# Patient Record
Sex: FEMALE | Race: WHITE | URBAN - METROPOLITAN AREA
[De-identification: names, ages, dates, MRNs, and addresses within clinical notes are randomized per-mention and may not be internally consistent; named-entity substitution may affect disease eponyms.]

---

## 2022-09-01 ENCOUNTER — DOCTOR'S OFFICE (OUTPATIENT)
Dept: URBAN - METROPOLITAN AREA CLINIC 125 | Facility: CLINIC | Age: 47
Setting detail: OPHTHALMOLOGY
End: 2022-09-01
Payer: OTHER GOVERNMENT

## 2022-09-01 ENCOUNTER — RX ONLY (RX ONLY)
Age: 47
End: 2022-09-01

## 2022-09-01 DIAGNOSIS — H35.373: ICD-10-CM

## 2022-09-01 DIAGNOSIS — H40.033: ICD-10-CM

## 2022-09-01 PROCEDURE — 92134 CPTRZ OPH DX IMG PST SGM RTA: CPT | Performed by: OPHTHALMOLOGY

## 2022-09-01 PROCEDURE — 92020 GONIOSCOPY: CPT | Performed by: OPHTHALMOLOGY

## 2022-09-01 PROCEDURE — 92004 COMPRE OPH EXAM NEW PT 1/>: CPT | Performed by: OPHTHALMOLOGY

## 2022-09-01 ASSESSMENT — REFRACTION_AUTOREFRACTION
OS_SPHERE: +4.75
OD_AXIS: 003
OS_CYLINDER: -0.75
OD_CYLINDER: -0.25
OD_SPHERE: +2.00
OS_AXIS: 024

## 2022-09-01 ASSESSMENT — VISUAL ACUITY
OS_BCVA: 20/20-2
OD_BCVA: 20/80

## 2022-09-01 ASSESSMENT — CONFRONTATIONAL VISUAL FIELD TEST (CVF)
OS_FINDINGS: FULL
OD_FINDINGS: FULL

## 2022-09-01 ASSESSMENT — AXIALLENGTH_DERIVED
OS_AL: 21.457
OD_AL: 22.0108

## 2022-09-01 ASSESSMENT — KERATOMETRY
OD_AXISANGLE_DEGREES: 090
OS_AXISANGLE_DEGREES: 099
OS_K2POWER_DIOPTERS: 46.25
OD_K2POWER_DIOPTERS: 47.00
OD_K1POWER_DIOPTERS: 45.25
OS_K1POWER_DIOPTERS: 44.25

## 2022-09-01 ASSESSMENT — SPHEQUIV_DERIVED
OS_SPHEQUIV: 4.375
OD_SPHEQUIV: 1.875

## 2022-09-21 ENCOUNTER — AMBUL SURGICAL CARE (OUTPATIENT)
Dept: URBAN - METROPOLITAN AREA SURGERY 29 | Facility: SURGERY | Age: 47
Setting detail: OPHTHALMOLOGY
End: 2022-09-21
Payer: OTHER GOVERNMENT

## 2022-09-21 DIAGNOSIS — H40.032: ICD-10-CM

## 2022-09-21 PROCEDURE — 66761 REVISION OF IRIS: CPT | Performed by: OPHTHALMOLOGY

## 2022-10-05 ENCOUNTER — AMBUL SURGICAL CARE (OUTPATIENT)
Dept: URBAN - METROPOLITAN AREA SURGERY 29 | Facility: SURGERY | Age: 47
Setting detail: OPHTHALMOLOGY
End: 2022-10-05
Payer: OTHER GOVERNMENT

## 2022-10-05 DIAGNOSIS — H40.031: ICD-10-CM

## 2022-10-05 PROCEDURE — 66761 REVISION OF IRIS: CPT | Performed by: OPHTHALMOLOGY

## 2022-11-03 ENCOUNTER — DOCTOR'S OFFICE (OUTPATIENT)
Dept: URBAN - METROPOLITAN AREA CLINIC 125 | Facility: CLINIC | Age: 47
Setting detail: OPHTHALMOLOGY
End: 2022-11-03
Payer: OTHER GOVERNMENT

## 2022-11-03 DIAGNOSIS — H35.373: ICD-10-CM

## 2022-11-03 DIAGNOSIS — H04.123: ICD-10-CM

## 2022-11-03 DIAGNOSIS — H40.033: ICD-10-CM

## 2022-11-03 PROCEDURE — 99213 OFFICE O/P EST LOW 20 MIN: CPT | Performed by: OPHTHALMOLOGY

## 2022-11-03 ASSESSMENT — AXIALLENGTH_DERIVED
OS_AL: 21.4997
OD_AL: 22.3427

## 2022-11-03 ASSESSMENT — REFRACTION_AUTOREFRACTION
OD_SPHERE: +1.75
OS_SPHERE: +4.75
OS_CYLINDER: -1.25
OD_CYLINDER: -0.50
OS_AXIS: 021
OD_AXIS: 006

## 2022-11-03 ASSESSMENT — SPHEQUIV_DERIVED
OS_SPHEQUIV: 4.125
OD_SPHEQUIV: 1.5

## 2022-11-03 ASSESSMENT — CONFRONTATIONAL VISUAL FIELD TEST (CVF)
OS_FINDINGS: FULL
OD_FINDINGS: FULL

## 2022-11-03 ASSESSMENT — KERATOMETRY
OD_K2POWER_DIOPTERS: 46.25
OD_K1POWER_DIOPTERS: 44.75
OS_AXISANGLE_DEGREES: 104
OS_K1POWER_DIOPTERS: 44.00
OD_AXISANGLE_DEGREES: 093
OS_K2POWER_DIOPTERS: 46.75

## 2022-11-03 ASSESSMENT — VISUAL ACUITY
OD_BCVA: 20/30-2
OS_BCVA: 20/20-1

## 2024-02-12 ENCOUNTER — OFFICE VISIT (OUTPATIENT)
Age: 49
End: 2024-02-12
Payer: OTHER GOVERNMENT

## 2024-02-12 VITALS
SYSTOLIC BLOOD PRESSURE: 126 MMHG | DIASTOLIC BLOOD PRESSURE: 68 MMHG | OXYGEN SATURATION: 100 % | WEIGHT: 159 LBS | HEART RATE: 61 BPM | BODY MASS INDEX: 26.49 KG/M2 | RESPIRATION RATE: 16 BRPM | TEMPERATURE: 97.1 F | HEIGHT: 65 IN

## 2024-02-12 DIAGNOSIS — R10.2 PELVIC PAIN IN FEMALE: Primary | ICD-10-CM

## 2024-02-12 DIAGNOSIS — M54.9 MID-BACK PAIN, ACUTE: ICD-10-CM

## 2024-02-12 LAB
SL AMB  POCT GLUCOSE, UA: NEGATIVE
SL AMB LEUKOCYTE ESTERASE,UA: NEGATIVE
SL AMB POCT BILIRUBIN,UA: NEGATIVE
SL AMB POCT BLOOD,UA: ABNORMAL
SL AMB POCT CLARITY,UA: CLEAR
SL AMB POCT COLOR,UA: YELLOW
SL AMB POCT KETONES,UA: NEGATIVE
SL AMB POCT NITRITE,UA: NEGATIVE
SL AMB POCT PH,UA: 6
SL AMB POCT SPECIFIC GRAVITY,UA: 1.02
SL AMB POCT URINE PROTEIN: NEGATIVE
SL AMB POCT UROBILINOGEN: 0.2

## 2024-02-12 PROCEDURE — 99213 OFFICE O/P EST LOW 20 MIN: CPT | Performed by: EMERGENCY MEDICINE

## 2024-02-12 PROCEDURE — 87086 URINE CULTURE/COLONY COUNT: CPT | Performed by: EMERGENCY MEDICINE

## 2024-02-12 PROCEDURE — G0463 HOSPITAL OUTPT CLINIC VISIT: HCPCS | Performed by: EMERGENCY MEDICINE

## 2024-02-12 PROCEDURE — 81002 URINALYSIS NONAUTO W/O SCOPE: CPT | Performed by: EMERGENCY MEDICINE

## 2024-02-12 RX ORDER — ALBUTEROL SULFATE 90 UG/1
AEROSOL, METERED RESPIRATORY (INHALATION)
COMMUNITY
Start: 2023-08-23

## 2024-02-12 RX ORDER — ALPRAZOLAM 0.5 MG/1
0.5 TABLET ORAL
COMMUNITY

## 2024-02-12 NOTE — PROGRESS NOTES
[facility]        NAME: Matilda Parker is a 48 y.o. female  : 1975    MRN: [id number]  DATE: 2024  TIME: 5:36 PM    Assessment and Plan   Pelvic pain in female [R10.2]  1. Pelvic pain in female  POCT urine dip    Ambulatory Referral to Physical Therapy    Ambulatory Referral to Obstetrics / Gynecology      2. Mid-back pain, acute  Ambulatory Referral to Physical Therapy            Patient Instructions     Patient Instructions   Pelvic Pain   WHAT YOU NEED TO KNOW:   Pelvic pain may be caused by a number of conditions, such as irritable bowel syndrome, appendicitis, or constipation. A urinary tract infection, prostate inflammation, menstrual cramps, or kidney stones can also cause pelvic pain. You may have pain on one or both sides of your pelvis. Pelvic pain can develop if you have trauma to your pelvis or if you sit or stand for a long time.   DISCHARGE INSTRUCTIONS:   Medicines:  You may need any of the following:  NSAIDs , such as ibuprofen, help decrease swelling, pain, and fever. This medicine is available with or without a doctor's order. NSAIDs can cause stomach bleeding or kidney problems in certain people. If you take blood thinner medicine, always ask your healthcare provider if NSAIDs are safe for you. Always read the medicine label and follow directions.    Prescription pain medicine  may be given. Ask how to take this medicine safely.    Birth control medicines  may help decrease pain in women.    Take your medicine as directed.  Contact your healthcare provider if you think your medicine is not helping or if you have side effects. Tell your provider if you are allergic to any medicine. Keep a list of the medicines, vitamins, and herbs you take. Include the amounts, and when and why you take them. Bring the list or the pill bottles to follow-up visits. Carry your medicine list with you in case of an emergency.    Call 911 for any of the following:   You have severe chest pain  and sudden trouble breathing.      Contact your healthcare provider if:   You have a fever.    You have nausea, vomiting, or diarrhea.    Your pain does not go away, or it gets worse, even after treatment.    You have numbness in your legs or toes.    You have heavy or unusual vaginal bleeding.    You have questions or concerns about your condition or care.    Manage your pelvic pain:   Keep a pain record.  Write down when your pain happens and how severe it is. Include any other symptoms you have with your pain. A record will help you keep track of pain cycles. Bring the record with you to your follow-up visits. It may also help your healthcare provider find out what is causing your pain.    Learn ways to relax.  Deep breathing, meditation, and relaxation techniques can help decrease your pain. When you are tense, your pain may increase.    Treat or prevent constipation.  Drink liquids as directed. You may need to drink more liquid than usual. Ask your healthcare provider how much liquid to drink each day. Eat high-fiber foods. High-fiber foods include fruit, vegetables, whole-grain breads and cereals, and beans. You may need to change the foods you eat if you have irritable bowel syndrome.    Follow up with your healthcare provider as directed:  You may need physical therapy. You may need to see an orthopedic specialist. Write down your questions so you remember to ask them during your visits.  © Copyright [employer] 2023 Information is for End User's use only and may not be sold, redistributed or otherwise used for commercial purposes.  The above information is an  only. It is not intended as medical advice for individual conditions or treatments. Talk to your doctor, nurse or pharmacist before following any medical regimen to see if it is safe and effective for you.    Abdominal Pain   WHAT YOU NEED TO KNOW:   Abdominal pain can be dull, achy, or sharp. You may have pain in one area of your abdomen,  or in your entire abdomen. Your pain may be caused by a condition such as constipation, food sensitivity or poisoning, infection, or a blockage. Abdominal pain can also be from a hernia, appendicitis, or an ulcer. Liver, gallbladder, or kidney conditions can also cause abdominal pain. The cause of your abdominal pain may not be known.       DISCHARGE INSTRUCTIONS:   Call your local emergency number (911 in the [place]) if:   You have chest pain or shortness of breath.      Return to the emergency department if:   You have pulsing pain in your upper abdomen or lower back that suddenly becomes constant.    Your pain is in the right lower abdominal area and worsens with movement.    You have a fever over 100.4°F (38°C) or shaking chills.    You are vomiting and cannot keep food or liquids down.    Your pain does not improve or gets worse over the next 8 to 12 hours.    You see blood in your vomit or bowel movements, or they look black and tarry.    Your skin or the whites of your eyes turn yellow.    You are a woman and have a large amount of vaginal bleeding that is not your monthly period.    Call your doctor if:   You have pain in your lower back.    You are a man and have pain in your testicles.    You have pain when you urinate.    You have questions or concerns about your condition or care.    Medicines:  You may need any of the following:  Medicines  may be given to calm your stomach or prevent vomiting.    Prescription pain medicine  may be given. Ask your healthcare provider how to take this medicine safely. Some prescription pain medicines contain acetaminophen. Do not take other medicines that contain acetaminophen without talking to your healthcare provider. Too much acetaminophen may cause liver damage. Prescription pain medicine may cause constipation. Ask your healthcare provider how to prevent or treat constipation.     Take your medicine as directed.  Contact your healthcare provider if you think your  medicine is not helping or if you have side effects. Tell your provider if you are allergic to any medicine. Keep a list of the medicines, vitamins, and herbs you take. Include the amounts, and when and why you take them. Bring the list or the pill bottles to follow-up visits. Carry your medicine list with you in case of an emergency.    Manage or prevent abdominal pain:   Apply heat  on your abdomen for 20 to 30 minutes every 2 hours for as many days as directed. Heat helps decrease pain and muscle spasms.    Make changes to the foods you eat, if needed.  Do not eat foods that cause abdominal pain or other symptoms. Eat small meals more often. The following changes may also help:    Eat more high-fiber foods if you are constipated.  High-fiber foods include fruits, vegetables, whole-grain foods, and legumes such as ortiz beans.         Do not eat foods that cause gas if you have bloating.  Examples include broccoli, cabbage, beans, and carbonated drinks.    Do not eat foods or drinks that contain sorbitol or fructose if you have diarrhea and bloating.  Some examples are fruit juices, candy, jelly, and sugar-free gum.    Do not eat high-fat foods.  Examples include fried foods, cheeseburgers, hot dogs, and desserts.    Make changes to the liquids you drink, if needed.  Do not drink liquids that cause pain or make it worse, such as orange juice. Drink liquids throughout the day to stay hydrated. The following changes may also help:    Drink more liquids to prevent dehydration from diarrhea or vomiting.  Ask your healthcare provider how much liquid to drink each day and which liquids are best for you.    Limit or do not have caffeine.  Caffeine may make symptoms such as heartburn or nausea worse.    Limit or do not drink alcohol.  Alcohol can make your abdominal pain worse. Ask your healthcare provider if it is okay for you to drink alcohol. Also ask how much is okay for you to drink. A drink of alcohol is 12 ounces  of beer, ½ ounce of liquor, or 5 ounces of wine.    Keep a diary of your abdominal pain.  A diary may help your healthcare provider learn what is causing your pain. Include when the pain happens, how long it lasts, and what the pain feels like. Write down any other symptoms you have with abdominal pain. Also write down what you eat, and any symptoms you have after you eat.    Manage stress.  Stress may cause abdominal pain. Your healthcare provider may recommend relaxation techniques and deep breathing exercises to help decrease your stress. Your healthcare provider may recommend you talk to someone about your stress or anxiety, such as a counselor or a friend. Get plenty of sleep. Exercise regularly.         Do not smoke.  Nicotine and other chemicals in cigarettes can damage your esophagus and stomach. Ask your healthcare provider for information if you currently smoke and need help to quit. E-cigarettes or smokeless tobacco still contain nicotine. Talk to your healthcare provider before you use these products.    Follow up with your doctor as directed:  Write down your questions so you remember to ask them during your visits.  © Copyright [employer] 2023 Information is for End User's use only and may not be sold, redistributed or otherwise used for commercial purposes.  The above information is an  only. It is not intended as medical advice for individual conditions or treatments. Talk to your doctor, nurse or pharmacist before following any medical regimen to see if it is safe and effective for you.  Back Pain   WHAT YOU NEED TO KNOW:   Back pain is common. You may have back pain and muscle spasms. You may feel sore or stiff on one or both sides of your back. The pain may spread to your lower body. Conditions that affect the spine, joints, or muscles can cause back pain. These may include arthritis, spinal stenosis (narrowing of the spinal column), muscle tension, or breakdown of the spinal  discs.  DISCHARGE INSTRUCTIONS:   Call your local emergency number (911 in the [place]) if:   You have severe back pain with chest pain.    You cannot control your urine or bowel movements.    Your pain becomes so severe that you cannot walk.    Return to the emergency department if:   You have pain, numbness, or weakness in one or both legs.    You have severe back pain, nausea, and vomiting.    You have severe back pain that spreads to your side or genital area.    Call your doctor if:   You have back pain that does not get better with rest and pain medicine.    You have a fever.    You have pain that worsens when you are on your back or when you rest.    You have pain that worsens when you cough or sneeze.    You lose weight without trying.    You have questions or concerns about your condition or care.    Medicines:  You may need any of the following:  NSAIDs  help decrease swelling and pain or fever. This medicine is available with or without a doctor's order. NSAIDs can cause stomach bleeding or kidney problems in certain people. If you take blood thinner medicine, always ask your healthcare provider if NSAIDs are safe for you. Always read the medicine label and follow directions.    Acetaminophen  decreases pain and fever. It is available without a doctor's order. Ask how much to take and how often to take it. Follow directions. Read the labels of all other medicines you are using to see if they also contain acetaminophen, or ask your doctor or pharmacist. Acetaminophen can cause liver damage if not taken correctly.    Muscle relaxers  help decrease muscle spasms and back pain.    Prescription pain medicine  may be given. Ask your healthcare provider how to take this medicine safely. Some prescription pain medicines contain acetaminophen. Do not take other medicines that contain acetaminophen without talking to your healthcare provider. Too much acetaminophen may cause liver damage. Prescription pain medicine may  cause constipation. Ask your healthcare provider how to prevent or treat constipation.     Take your medicine as directed.  Contact your healthcare provider if you think your medicine is not helping or if you have side effects. Tell your provider if you are allergic to any medicine. Keep a list of the medicines, vitamins, and herbs you take. Include the amounts, and when and why you take them. Bring the list or the pill bottles to follow-up visits. Carry your medicine list with you in case of an emergency.    How to manage your back pain:   Apply ice  on your back for 15 to 20 minutes every hour or as directed. Use an ice pack, or put crushed ice in a plastic bag. Cover it with a towel before you apply it to your skin. Ice helps prevent tissue damage and decreases pain.    Apply heat  on your back for 20 to 30 minutes every 2 hours for as many days as directed. Heat helps decrease pain and muscle spasms.    Stay active  as much as you can without causing more pain. Bed rest could make your back pain worse. Avoid heavy lifting until your pain is gone.         Go to physical therapy  as directed. A physical therapist can teach you exercises to help improve movement and strength, and to decrease pain.    Follow up with your doctor in 2 weeks, or as directed:  You might need to see a specialist. Write down your questions so you remember to ask them during your visits.  © Copyright [employer] 2023 Information is for End User's use only and may not be sold, redistributed or otherwise used for commercial purposes.  The above information is an  only. It is not intended as medical advice for individual conditions or treatments. Talk to your doctor, nurse or pharmacist before following any medical regimen to see if it is safe and effective for you.      Follow up with PCP in 3-5 days.  Proceed to  ER if symptoms worsen.    Chief Complaint     Chief Complaint   Patient presents with    Pelvic Pain     Two days.  "Low pelvic pain and mid back pain. Going through menopause but menses started to become regular again. Menses ended about four days ago. Denies urinary frequency, no burning with urination. No concern  for STD. No irregular vaginal discharge.         History of Present Illness       Patient complains of pelvic \"pressure\" for the past few days.  Patient denies any urinary symptoms.  Patient denies similar symptoms in the past.  Patient had her routine GYN examination a few months ago with no abnormalities detected.  She also notes some mid back pain recently but denies any precipitating trauma.        Review of Systems   Review of Systems   Constitutional:  Negative for chills, fatigue and fever.   HENT:  Negative for congestion, trouble swallowing and voice change.    Respiratory:  Negative for cough, chest tightness, shortness of breath and wheezing.    Cardiovascular:  Negative for chest pain.   Gastrointestinal:  Negative for abdominal distention, abdominal pain, anal bleeding, blood in stool, constipation, diarrhea, nausea, rectal pain and vomiting.   Genitourinary:  Positive for pelvic pain. Negative for dysuria.   Musculoskeletal:  Positive for back pain. Negative for neck stiffness.   Skin:  Negative for rash.   Hematological:  Negative for adenopathy.         Current Medications       Current Outpatient Medications:     albuterol (PROVENTIL HFA,VENTOLIN HFA) 90 mcg/act inhaler, inhale 1 to 2 puffs INTO THE LUNGS every 6 hours if needed, Disp: , Rfl:     ALPRAZolam (XANAX) 0.5 mg tablet, Take 0.5 mg by mouth, Disp: , Rfl:     Current Allergies     Allergies as of 02/12/2024    (No Known Allergies)            The following portions of the patient's history were reviewed and updated as appropriate: allergies, current medications, past family history, past medical history, past social history, past surgical history and problem list.     History reviewed. No pertinent past medical history.    Past Surgical " "History:   Procedure Laterality Date    SKIN CANCER EXCISION      TONSILLECTOMY         Family History   Problem Relation Age of Onset    No Known Problems Mother     No Known Problems Father          Medications have been verified.        Objective   /68   Pulse 61   Temp (!) 97.1 °F (36.2 °C)   Resp 16   Ht 5' 5\" (1.651 m)   Wt 72.1 kg (159 lb)   SpO2 100%   BMI 26.46 kg/m²        Physical Exam     Physical Exam  Vitals and nursing note reviewed.   Constitutional:       General: She is not in acute distress.     Appearance: She is well-developed. She is not ill-appearing or toxic-appearing.   HENT:      Head: Normocephalic and atraumatic.      Nose: Mucosal edema present.      Mouth/Throat:      Pharynx: No oropharyngeal exudate or posterior oropharyngeal erythema.      Tonsils: No tonsillar abscesses.   Cardiovascular:      Rate and Rhythm: Normal rate and regular rhythm.   Abdominal:      General: Abdomen is flat. Bowel sounds are normal. There is no distension.      Palpations: Abdomen is soft. There is no mass.      Tenderness: There is no abdominal tenderness. There is no guarding or rebound.   Musculoskeletal:      Cervical back: Neck supple.   Skin:     General: Skin is warm and dry.      Coloration: Skin is not pale.      Findings: No rash.   Neurological:      Mental Status: She is alert and oriented to person, place, and time.   Psychiatric:         Mood and Affect: Mood normal.         Behavior: Behavior normal.         Thought Content: Thought content normal.         Judgment: Judgment normal.                   "

## 2024-02-12 NOTE — PATIENT INSTRUCTIONS
Pelvic Pain   WHAT YOU NEED TO KNOW:   Pelvic pain may be caused by a number of conditions, such as irritable bowel syndrome, appendicitis, or constipation. A urinary tract infection, prostate inflammation, menstrual cramps, or kidney stones can also cause pelvic pain. You may have pain on one or both sides of your pelvis. Pelvic pain can develop if you have trauma to your pelvis or if you sit or stand for a long time.   DISCHARGE INSTRUCTIONS:   Medicines:  You may need any of the following:  NSAIDs , such as ibuprofen, help decrease swelling, pain, and fever. This medicine is available with or without a doctor's order. NSAIDs can cause stomach bleeding or kidney problems in certain people. If you take blood thinner medicine, always ask your healthcare provider if NSAIDs are safe for you. Always read the medicine label and follow directions.    Prescription pain medicine  may be given. Ask how to take this medicine safely.    Birth control medicines  may help decrease pain in women.    Take your medicine as directed.  Contact your healthcare provider if you think your medicine is not helping or if you have side effects. Tell your provider if you are allergic to any medicine. Keep a list of the medicines, vitamins, and herbs you take. Include the amounts, and when and why you take them. Bring the list or the pill bottles to follow-up visits. Carry your medicine list with you in case of an emergency.    Call 911 for any of the following:   You have severe chest pain and sudden trouble breathing.      Contact your healthcare provider if:   You have a fever.    You have nausea, vomiting, or diarrhea.    Your pain does not go away, or it gets worse, even after treatment.    You have numbness in your legs or toes.    You have heavy or unusual vaginal bleeding.    You have questions or concerns about your condition or care.    Manage your pelvic pain:   Keep a pain record.  Write down when your pain happens and how severe  it is. Include any other symptoms you have with your pain. A record will help you keep track of pain cycles. Bring the record with you to your follow-up visits. It may also help your healthcare provider find out what is causing your pain.    Learn ways to relax.  Deep breathing, meditation, and relaxation techniques can help decrease your pain. When you are tense, your pain may increase.    Treat or prevent constipation.  Drink liquids as directed. You may need to drink more liquid than usual. Ask your healthcare provider how much liquid to drink each day. Eat high-fiber foods. High-fiber foods include fruit, vegetables, whole-grain breads and cereals, and beans. You may need to change the foods you eat if you have irritable bowel syndrome.    Follow up with your healthcare provider as directed:  You may need physical therapy. You may need to see an orthopedic specialist. Write down your questions so you remember to ask them during your visits.  © Copyright [employer] 2023 Information is for End User's use only and may not be sold, redistributed or otherwise used for commercial purposes.  The above information is an  only. It is not intended as medical advice for individual conditions or treatments. Talk to your doctor, nurse or pharmacist before following any medical regimen to see if it is safe and effective for you.    Abdominal Pain   WHAT YOU NEED TO KNOW:   Abdominal pain can be dull, achy, or sharp. You may have pain in one area of your abdomen, or in your entire abdomen. Your pain may be caused by a condition such as constipation, food sensitivity or poisoning, infection, or a blockage. Abdominal pain can also be from a hernia, appendicitis, or an ulcer. Liver, gallbladder, or kidney conditions can also cause abdominal pain. The cause of your abdominal pain may not be known.       DISCHARGE INSTRUCTIONS:   Call your local emergency number (911 in the [place]) if:   You have chest pain or shortness of  breath.      Return to the emergency department if:   You have pulsing pain in your upper abdomen or lower back that suddenly becomes constant.    Your pain is in the right lower abdominal area and worsens with movement.    You have a fever over 100.4°F (38°C) or shaking chills.    You are vomiting and cannot keep food or liquids down.    Your pain does not improve or gets worse over the next 8 to 12 hours.    You see blood in your vomit or bowel movements, or they look black and tarry.    Your skin or the whites of your eyes turn yellow.    You are a woman and have a large amount of vaginal bleeding that is not your monthly period.    Call your doctor if:   You have pain in your lower back.    You are a man and have pain in your testicles.    You have pain when you urinate.    You have questions or concerns about your condition or care.    Medicines:  You may need any of the following:  Medicines  may be given to calm your stomach or prevent vomiting.    Prescription pain medicine  may be given. Ask your healthcare provider how to take this medicine safely. Some prescription pain medicines contain acetaminophen. Do not take other medicines that contain acetaminophen without talking to your healthcare provider. Too much acetaminophen may cause liver damage. Prescription pain medicine may cause constipation. Ask your healthcare provider how to prevent or treat constipation.     Take your medicine as directed.  Contact your healthcare provider if you think your medicine is not helping or if you have side effects. Tell your provider if you are allergic to any medicine. Keep a list of the medicines, vitamins, and herbs you take. Include the amounts, and when and why you take them. Bring the list or the pill bottles to follow-up visits. Carry your medicine list with you in case of an emergency.    Manage or prevent abdominal pain:   Apply heat  on your abdomen for 20 to 30 minutes every 2 hours for as many days as  directed. Heat helps decrease pain and muscle spasms.    Make changes to the foods you eat, if needed.  Do not eat foods that cause abdominal pain or other symptoms. Eat small meals more often. The following changes may also help:    Eat more high-fiber foods if you are constipated.  High-fiber foods include fruits, vegetables, whole-grain foods, and legumes such as ortiz beans.         Do not eat foods that cause gas if you have bloating.  Examples include broccoli, cabbage, beans, and carbonated drinks.    Do not eat foods or drinks that contain sorbitol or fructose if you have diarrhea and bloating.  Some examples are fruit juices, candy, jelly, and sugar-free gum.    Do not eat high-fat foods.  Examples include fried foods, cheeseburgers, hot dogs, and desserts.    Make changes to the liquids you drink, if needed.  Do not drink liquids that cause pain or make it worse, such as orange juice. Drink liquids throughout the day to stay hydrated. The following changes may also help:    Drink more liquids to prevent dehydration from diarrhea or vomiting.  Ask your healthcare provider how much liquid to drink each day and which liquids are best for you.    Limit or do not have caffeine.  Caffeine may make symptoms such as heartburn or nausea worse.    Limit or do not drink alcohol.  Alcohol can make your abdominal pain worse. Ask your healthcare provider if it is okay for you to drink alcohol. Also ask how much is okay for you to drink. A drink of alcohol is 12 ounces of beer, ½ ounce of liquor, or 5 ounces of wine.    Keep a diary of your abdominal pain.  A diary may help your healthcare provider learn what is causing your pain. Include when the pain happens, how long it lasts, and what the pain feels like. Write down any other symptoms you have with abdominal pain. Also write down what you eat, and any symptoms you have after you eat.    Manage stress.  Stress may cause abdominal pain. Your healthcare provider may  recommend relaxation techniques and deep breathing exercises to help decrease your stress. Your healthcare provider may recommend you talk to someone about your stress or anxiety, such as a counselor or a friend. Get plenty of sleep. Exercise regularly.         Do not smoke.  Nicotine and other chemicals in cigarettes can damage your esophagus and stomach. Ask your healthcare provider for information if you currently smoke and need help to quit. E-cigarettes or smokeless tobacco still contain nicotine. Talk to your healthcare provider before you use these products.    Follow up with your doctor as directed:  Write down your questions so you remember to ask them during your visits.  © Copyright [employer] 2023 Information is for End User's use only and may not be sold, redistributed or otherwise used for commercial purposes.  The above information is an  only. It is not intended as medical advice for individual conditions or treatments. Talk to your doctor, nurse or pharmacist before following any medical regimen to see if it is safe and effective for you.  Back Pain   WHAT YOU NEED TO KNOW:   Back pain is common. You may have back pain and muscle spasms. You may feel sore or stiff on one or both sides of your back. The pain may spread to your lower body. Conditions that affect the spine, joints, or muscles can cause back pain. These may include arthritis, spinal stenosis (narrowing of the spinal column), muscle tension, or breakdown of the spinal discs.  DISCHARGE INSTRUCTIONS:   Call your local emergency number (372 in the [place]) if:   You have severe back pain with chest pain.    You cannot control your urine or bowel movements.    Your pain becomes so severe that you cannot walk.    Return to the emergency department if:   You have pain, numbness, or weakness in one or both legs.    You have severe back pain, nausea, and vomiting.    You have severe back pain that spreads to your side or genital  area.    Call your doctor if:   You have back pain that does not get better with rest and pain medicine.    You have a fever.    You have pain that worsens when you are on your back or when you rest.    You have pain that worsens when you cough or sneeze.    You lose weight without trying.    You have questions or concerns about your condition or care.    Medicines:  You may need any of the following:  NSAIDs  help decrease swelling and pain or fever. This medicine is available with or without a doctor's order. NSAIDs can cause stomach bleeding or kidney problems in certain people. If you take blood thinner medicine, always ask your healthcare provider if NSAIDs are safe for you. Always read the medicine label and follow directions.    Acetaminophen  decreases pain and fever. It is available without a doctor's order. Ask how much to take and how often to take it. Follow directions. Read the labels of all other medicines you are using to see if they also contain acetaminophen, or ask your doctor or pharmacist. Acetaminophen can cause liver damage if not taken correctly.    Muscle relaxers  help decrease muscle spasms and back pain.    Prescription pain medicine  may be given. Ask your healthcare provider how to take this medicine safely. Some prescription pain medicines contain acetaminophen. Do not take other medicines that contain acetaminophen without talking to your healthcare provider. Too much acetaminophen may cause liver damage. Prescription pain medicine may cause constipation. Ask your healthcare provider how to prevent or treat constipation.     Take your medicine as directed.  Contact your healthcare provider if you think your medicine is not helping or if you have side effects. Tell your provider if you are allergic to any medicine. Keep a list of the medicines, vitamins, and herbs you take. Include the amounts, and when and why you take them. Bring the list or the pill bottles to follow-up visits. Carry  your medicine list with you in case of an emergency.    How to manage your back pain:   Apply ice  on your back for 15 to 20 minutes every hour or as directed. Use an ice pack, or put crushed ice in a plastic bag. Cover it with a towel before you apply it to your skin. Ice helps prevent tissue damage and decreases pain.    Apply heat  on your back for 20 to 30 minutes every 2 hours for as many days as directed. Heat helps decrease pain and muscle spasms.    Stay active  as much as you can without causing more pain. Bed rest could make your back pain worse. Avoid heavy lifting until your pain is gone.         Go to physical therapy  as directed. A physical therapist can teach you exercises to help improve movement and strength, and to decrease pain.    Follow up with your doctor in 2 weeks, or as directed:  You might need to see a specialist. Write down your questions so you remember to ask them during your visits.  © Copyright [employer] 2023 Information is for End User's use only and may not be sold, redistributed or otherwise used for commercial purposes.  The above information is an  only. It is not intended as medical advice for individual conditions or treatments. Talk to your doctor, nurse or pharmacist before following any medical regimen to see if it is safe and effective for you.

## 2024-02-13 LAB — BACTERIA UR CULT: NORMAL

## 2024-03-18 ENCOUNTER — PATIENT MESSAGE (OUTPATIENT)
Age: 49
End: 2024-03-18

## 2024-04-07 ENCOUNTER — OFFICE VISIT (OUTPATIENT)
Age: 49
End: 2024-04-07
Payer: OTHER GOVERNMENT

## 2024-04-07 VITALS
OXYGEN SATURATION: 100 % | HEIGHT: 65 IN | RESPIRATION RATE: 18 BRPM | SYSTOLIC BLOOD PRESSURE: 107 MMHG | TEMPERATURE: 97.2 F | BODY MASS INDEX: 26.49 KG/M2 | HEART RATE: 67 BPM | WEIGHT: 159 LBS | DIASTOLIC BLOOD PRESSURE: 78 MMHG

## 2024-04-07 DIAGNOSIS — L03.031 PARONYCHIA OF GREAT TOE, RIGHT: Primary | ICD-10-CM

## 2024-04-07 PROCEDURE — 10060 I&D ABSCESS SIMPLE/SINGLE: CPT | Performed by: EMERGENCY MEDICINE

## 2024-04-07 PROCEDURE — 87070 CULTURE OTHR SPECIMN AEROBIC: CPT | Performed by: EMERGENCY MEDICINE

## 2024-04-07 PROCEDURE — 87205 SMEAR GRAM STAIN: CPT | Performed by: EMERGENCY MEDICINE

## 2024-04-07 PROCEDURE — 87147 CULTURE TYPE IMMUNOLOGIC: CPT | Performed by: EMERGENCY MEDICINE

## 2024-04-07 PROCEDURE — 99213 OFFICE O/P EST LOW 20 MIN: CPT | Performed by: EMERGENCY MEDICINE

## 2024-04-07 PROCEDURE — 87186 SC STD MICRODIL/AGAR DIL: CPT | Performed by: EMERGENCY MEDICINE

## 2024-04-07 PROCEDURE — G0463 HOSPITAL OUTPT CLINIC VISIT: HCPCS | Performed by: EMERGENCY MEDICINE

## 2024-04-07 RX ORDER — CEPHALEXIN 500 MG/1
500 CAPSULE ORAL EVERY 8 HOURS SCHEDULED
Qty: 21 CAPSULE | Refills: 0 | Status: SHIPPED | OUTPATIENT
Start: 2024-04-07 | End: 2024-04-14

## 2024-04-07 NOTE — PATIENT INSTRUCTIONS
Paronychia   WHAT YOU NEED TO KNOW:   Paronychia is an infection of your nail fold caused by bacteria or a fungus. The nail fold is the skin around your nail. Paronychia may happen suddenly and last for 6 weeks or longer. You may have paronychia on more than 1 finger or toe.  DISCHARGE INSTRUCTIONS:   Return to the emergency department if:   You have severe nail pain.    The inflammation spreads to your hand or arm.    Call your doctor if:   Your nail becomes loose, deformed, or falls off.    You have a large abscess on your nail.    You have questions or concerns about your condition or care.    Medicines:   Td vaccine  is a booster shot used to help prevent tetanus and diphtheria. The Td booster may be given to adolescents and adults every 10 years or for certain wounds and injuries.    Antibiotics:  This medicine will help fight or prevent an infection. It may be given as a pill, cream, or ointment.    Steroids:  This medicine will help decrease inflammation. It may be given as a pill, cream, or ointment.    Antifungal medicine:  This medicine helps kill fungus that may be causing your infection. It may be given as a cream or ointment.    NSAIDs:  These medicines decrease pain and swelling. NSAIDs are available without a doctor's order. Ask your healthcare provider which medicine is right for you. Ask how much to take and when to take it. Take as directed. NSAIDs can cause stomach bleeding and kidney problems if not taken correctly.    Take your medicine as directed.  Contact your healthcare provider if you think your medicine is not helping or if you have side effects. Tell your provider if you are allergic to any medicine. Keep a list of the medicines, vitamins, and herbs you take. Include the amounts, and when and why you take them. Bring the list or the pill bottles to follow-up visits. Carry your medicine list with you in case of an emergency.    Self-care:   Soak your nail:  Soak your nail in a mixture of  equal parts vinegar and water 3 or 4 times each day. This will help decrease inflammation.    Apply a warm compress:  Soak a washcloth in warm water and place it on your nail. This will help decrease inflammation.     Elevate:  Raise your nail above the level of your heart as often as you can. This will help decrease swelling and pain. Prop your nail on pillows or blankets to keep it elevated comfortably.     Use lotion:  Apply lotion after you wash your hands. This will prevent your skin from becoming too dry.    Prevent paronychia:   Avoid chemicals and allergens that may harm your skin and nails. This includes soaps, laundry detergents, and nail products.    Keep your nails clean and dry. Avoid soaking your nails in water. Use cotton-lined rubber gloves or wear 2 rubber gloves if you work with food or water. The gloves will help protect your nail folds.    Keep your nails short. Do not bite your nails, pick at your hangnails, suck your fingers, or wear fake nails. Bring your own nail tools when you go to the nail salon.    Follow up with your doctor as directed:  Write down your questions so you remember to ask them during your visits.  © Copyright [employer] 2023 Information is for End User's use only and may not be sold, redistributed or otherwise used for commercial purposes.  The above information is an  only. It is not intended as medical advice for individual conditions or treatments. Talk to your doctor, nurse or pharmacist before following any medical regimen to see if it is safe and effective for you.  Abscess Incision and Drainage   AMBULATORY CARE:   What you need to know about an abscess incision and drainage:  An abscess incision and drainage (I and D) is a procedure to drain pus from an abscess and clean it out so it can heal.  How to prepare for an I and D:  Your healthcare provider will talk to you about how to prepare for an I and D. He or she will tell you what medicines to take or  not take on the day of your I and D.  What will happen during an I and D:  You will be given local or regional anesthesia to numb the area and keep you free from pain. Your healthcare provider will make an incision in your skin near or over the abscess. He or she will press on the area to drain the pus. A cotton swab or other medical tool wrapped in gauze may be used to clean the inside of the abscess. Your healthcare provider may wash the wound with saline (salt water). He or she may place plain gauze or gauze with medicine in your wound to help it heal. A dry bandage will be placed over your wound. You may be given antibiotics to treat the infection.  What are the risks of an I and D:  A scar may form on your skin as it heals. Your incision may heal slowly or get infected. Your abscess may come back, even after treatment. You may need another I and D if the abscess comes back. The bacteria may spread to your heart or other organs. This can be life-threatening.  Contact your healthcare provider if:   The area around your abscess has red streaks or is warm and painful.     You have a fever or chills.     You have increased redness, swelling, or pain in your wound.    Your wound does not start to heal after a few days.    Your abscess returns.     You have questions or concerns about your condition or care.    NSAIDs , such as ibuprofen, help decrease swelling, pain, and fever. NSAIDs can cause stomach bleeding or kidney problems in certain people. If you take blood thinner medicine, always ask your healthcare provider if NSAIDs are safe for you. Always read the medicine label and follow directions.  Care for your wound as directed:   Do not remove your bandage  unless your healthcare provider says it is okay. Keep the bandage clean and dry. Remove your bandage and clean the wound once your healthcare provider gives you directions.     Apply heat  on the bandage over your wound for 20 to 30 minutes every 2 hours for  as many days as directed. This will increase blood flow to the area and help it heal.    Elevate  your wound above level of your heart as often as you can. This will help decrease swelling and pain. Prop your wounded area on pillows or blankets to keep it elevated comfortably.    Follow up with your healthcare provider as directed:  You may need to return in 1 to 3 days to have the gauze in your wound removed and your wound examined. You may be taught how to change the gauze in your wound. Write down your questions so you remember to ask them during your visits.  © Copyright [employer] 2023 Information is for End User's use only and may not be sold, redistributed or otherwise used for commercial purposes.  The above information is an  only. It is not intended as medical advice for individual conditions or treatments. Talk to your doctor, nurse or pharmacist before following any medical regimen to see if it is safe and effective for you.

## 2024-04-07 NOTE — PROGRESS NOTES
[facility]        NAME: Matilda Parker is a 48 y.o. female  : 1975    MRN: [id number]  DATE: 2024  TIME: 3:01 PM    Assessment and Plan   Paronychia of great toe, right [L03.031]  1. Paronychia of great toe, right  Wound culture and Gram stain    Wound culture and Gram stain    cephalexin (KEFLEX) 500 mg capsule      Incision and drain    Date/Time: 2024 3:00 PM    Performed by: Mariusz Gutierrez MD  Authorized by: Mariusz Gutierrez MD  Universal Protocol:  Consent: Verbal consent obtained.  Risks and benefits: risks, benefits and alternatives were discussed  Consent given by: patient  Patient understanding: patient states understanding of the procedure being performed  Patient consent: the patient's understanding of the procedure matches consent given  Procedure consent: procedure consent matches procedure scheduled  Relevant documents: relevant documents present and verified  Patient identity confirmed: verbally with patient    Patient location:  Bedside  Location:     Type:  Abscess    Size:  3x1 mm    Location:  Lower extremity    Lower extremity location:  R big toe  Anesthesia (see MAR for exact dosages):     Anesthesia method:  None  Procedure details:     Complexity:  Simple    Needle aspiration: no      Incision types:  Stab incision (18-gauge sterile needle)    Approach:  Open    Incision depth:  Subcutaneous    Drainage:  Purulent    Drainage amount:  Moderate    Wound treatment:  Wound left open  Post-procedure details:     Patient tolerance of procedure:  Tolerated well, no immediate complications  Comments:      Offered digital block then stab incision versus stab incision with 18-gauge needle without local or digital anesthesia.  Patient chooses stab incision without anesthesia.  C&S obtained          Patient Instructions     Patient Instructions   Paronychia   WHAT YOU NEED TO KNOW:   Paronychia is an infection of your nail fold caused by bacteria or a fungus. The nail fold  is the skin around your nail. Paronychia may happen suddenly and last for 6 weeks or longer. You may have paronychia on more than 1 finger or toe.  DISCHARGE INSTRUCTIONS:   Return to the emergency department if:   You have severe nail pain.    The inflammation spreads to your hand or arm.    Call your doctor if:   Your nail becomes loose, deformed, or falls off.    You have a large abscess on your nail.    You have questions or concerns about your condition or care.    Medicines:   Td vaccine  is a booster shot used to help prevent tetanus and diphtheria. The Td booster may be given to adolescents and adults every 10 years or for certain wounds and injuries.    Antibiotics:  This medicine will help fight or prevent an infection. It may be given as a pill, cream, or ointment.    Steroids:  This medicine will help decrease inflammation. It may be given as a pill, cream, or ointment.    Antifungal medicine:  This medicine helps kill fungus that may be causing your infection. It may be given as a cream or ointment.    NSAIDs:  These medicines decrease pain and swelling. NSAIDs are available without a doctor's order. Ask your healthcare provider which medicine is right for you. Ask how much to take and when to take it. Take as directed. NSAIDs can cause stomach bleeding and kidney problems if not taken correctly.    Take your medicine as directed.  Contact your healthcare provider if you think your medicine is not helping or if you have side effects. Tell your provider if you are allergic to any medicine. Keep a list of the medicines, vitamins, and herbs you take. Include the amounts, and when and why you take them. Bring the list or the pill bottles to follow-up visits. Carry your medicine list with you in case of an emergency.    Self-care:   Soak your nail:  Soak your nail in a mixture of equal parts vinegar and water 3 or 4 times each day. This will help decrease inflammation.    Apply a warm compress:  Soak a  washcloth in warm water and place it on your nail. This will help decrease inflammation.     Elevate:  Raise your nail above the level of your heart as often as you can. This will help decrease swelling and pain. Prop your nail on pillows or blankets to keep it elevated comfortably.     Use lotion:  Apply lotion after you wash your hands. This will prevent your skin from becoming too dry.    Prevent paronychia:   Avoid chemicals and allergens that may harm your skin and nails. This includes soaps, laundry detergents, and nail products.    Keep your nails clean and dry. Avoid soaking your nails in water. Use cotton-lined rubber gloves or wear 2 rubber gloves if you work with food or water. The gloves will help protect your nail folds.    Keep your nails short. Do not bite your nails, pick at your hangnails, suck your fingers, or wear fake nails. Bring your own nail tools when you go to the nail salon.    Follow up with your doctor as directed:  Write down your questions so you remember to ask them during your visits.  © Copyright [employer] 2023 Information is for End User's use only and may not be sold, redistributed or otherwise used for commercial purposes.  The above information is an  only. It is not intended as medical advice for individual conditions or treatments. Talk to your doctor, nurse or pharmacist before following any medical regimen to see if it is safe and effective for you.  Abscess Incision and Drainage   AMBULATORY CARE:   What you need to know about an abscess incision and drainage:  An abscess incision and drainage (I and D) is a procedure to drain pus from an abscess and clean it out so it can heal.  How to prepare for an I and D:  Your healthcare provider will talk to you about how to prepare for an I and D. He or she will tell you what medicines to take or not take on the day of your I and D.  What will happen during an I and D:  You will be given local or regional anesthesia to  numb the area and keep you free from pain. Your healthcare provider will make an incision in your skin near or over the abscess. He or she will press on the area to drain the pus. A cotton swab or other medical tool wrapped in gauze may be used to clean the inside of the abscess. Your healthcare provider may wash the wound with saline (salt water). He or she may place plain gauze or gauze with medicine in your wound to help it heal. A dry bandage will be placed over your wound. You may be given antibiotics to treat the infection.  What are the risks of an I and D:  A scar may form on your skin as it heals. Your incision may heal slowly or get infected. Your abscess may come back, even after treatment. You may need another I and D if the abscess comes back. The bacteria may spread to your heart or other organs. This can be life-threatening.  Contact your healthcare provider if:   The area around your abscess has red streaks or is warm and painful.     You have a fever or chills.     You have increased redness, swelling, or pain in your wound.    Your wound does not start to heal after a few days.    Your abscess returns.     You have questions or concerns about your condition or care.    NSAIDs , such as ibuprofen, help decrease swelling, pain, and fever. NSAIDs can cause stomach bleeding or kidney problems in certain people. If you take blood thinner medicine, always ask your healthcare provider if NSAIDs are safe for you. Always read the medicine label and follow directions.  Care for your wound as directed:   Do not remove your bandage  unless your healthcare provider says it is okay. Keep the bandage clean and dry. Remove your bandage and clean the wound once your healthcare provider gives you directions.     Apply heat  on the bandage over your wound for 20 to 30 minutes every 2 hours for as many days as directed. This will increase blood flow to the area and help it heal.    Elevate  your wound above level of  your heart as often as you can. This will help decrease swelling and pain. Prop your wounded area on pillows or blankets to keep it elevated comfortably.    Follow up with your healthcare provider as directed:  You may need to return in 1 to 3 days to have the gauze in your wound removed and your wound examined. You may be taught how to change the gauze in your wound. Write down your questions so you remember to ask them during your visits.  © Copyright [employer] 2023 Information is for End User's use only and may not be sold, redistributed or otherwise used for commercial purposes.  The above information is an  only. It is not intended as medical advice for individual conditions or treatments. Talk to your doctor, nurse or pharmacist before following any medical regimen to see if it is safe and effective for you.      Follow up with PCP in 3-5 days.  Proceed to  ER if symptoms worsen.    Chief Complaint     Chief Complaint   Patient presents with    Toe Pain     C/o redness and pain around tow nail of right big toe. Pt. Recently had a pedicure and is questioning infection.          History of Present Illness       Patient complains of redness swelling and pain nail margin right great toe since pedicure 2 days ago.    Toe Pain         Review of Systems   Review of Systems   Constitutional:  Negative for activity change.   Gastrointestinal:  Negative for abdominal pain.   Musculoskeletal:  Negative for arthralgias, back pain, joint swelling, myalgias, neck pain and neck stiffness.   Skin:  Positive for color change. Negative for wound.   Neurological:  Negative for dizziness, syncope, weakness, light-headedness and headaches.         Current Medications       Current Outpatient Medications:     albuterol (PROVENTIL HFA,VENTOLIN HFA) 90 mcg/act inhaler, inhale 1 to 2 puffs INTO THE LUNGS every 6 hours if needed, Disp: , Rfl:     ALPRAZolam (XANAX) 0.5 mg tablet, Take 0.5 mg by mouth, Disp: , Rfl:      "cephalexin (KEFLEX) 500 mg capsule, Take 1 capsule (500 mg total) by mouth every 8 (eight) hours for 7 days, Disp: 21 capsule, Rfl: 0    Current Allergies     Allergies as of 04/07/2024    (No Known Allergies)            The following portions of the patient's history were reviewed and updated as appropriate: allergies, current medications, past family history, past medical history, past social history, past surgical history and problem list.     No past medical history on file.    Past Surgical History:   Procedure Laterality Date    SKIN CANCER EXCISION      TONSILLECTOMY         Family History   Problem Relation Age of Onset    No Known Problems Mother     No Known Problems Father          Medications have been verified.        Objective   /78 (BP Location: Right arm, Patient Position: Sitting)   Pulse 67   Temp (!) 97.2 °F (36.2 °C)   Resp 18   Ht 5' 5\" (1.651 m)   Wt 72.1 kg (159 lb)   SpO2 100%   BMI 26.46 kg/m²        Physical Exam     Physical Exam  Vitals and nursing note reviewed.   Constitutional:       General: She is not in acute distress.     Appearance: She is well-developed. She is not ill-appearing.   HENT:      Head: Normocephalic and atraumatic.   Eyes:      Conjunctiva/sclera: Conjunctivae normal.      Pupils: Pupils are equal, round, and reactive to light.   Musculoskeletal:         General: Swelling and tenderness present.      Cervical back: Normal range of motion and neck supple.   Skin:     General: Skin is warm and dry.      Findings: Erythema present. No rash.   Neurological:      Mental Status: She is alert and oriented to person, place, and time.      Deep Tendon Reflexes: Reflexes normal.   Psychiatric:         Mood and Affect: Mood normal.         Behavior: Behavior normal.         Thought Content: Thought content normal.         Judgment: Judgment normal.                   "

## 2024-04-10 LAB
BACTERIA WND AEROBE CULT: ABNORMAL
GRAM STN SPEC: ABNORMAL
GRAM STN SPEC: ABNORMAL

## 2024-04-11 ENCOUNTER — TELEPHONE (OUTPATIENT)
Age: 49
End: 2024-04-11

## 2024-05-12 ENCOUNTER — OFFICE VISIT (OUTPATIENT)
Age: 49
End: 2024-05-12
Payer: OTHER GOVERNMENT

## 2024-05-12 VITALS
TEMPERATURE: 96.7 F | HEIGHT: 65 IN | WEIGHT: 166.67 LBS | BODY MASS INDEX: 27.77 KG/M2 | SYSTOLIC BLOOD PRESSURE: 118 MMHG | OXYGEN SATURATION: 98 % | RESPIRATION RATE: 16 BRPM | DIASTOLIC BLOOD PRESSURE: 54 MMHG | HEART RATE: 67 BPM

## 2024-05-12 DIAGNOSIS — S80.12XA TRAUMATIC HEMATOMA OF LEFT LOWER LEG, INITIAL ENCOUNTER: Primary | ICD-10-CM

## 2024-05-12 PROCEDURE — 99212 OFFICE O/P EST SF 10 MIN: CPT | Performed by: PHYSICIAN ASSISTANT

## 2024-05-12 PROCEDURE — G0463 HOSPITAL OUTPT CLINIC VISIT: HCPCS | Performed by: PHYSICIAN ASSISTANT

## 2024-05-12 NOTE — PATIENT INSTRUCTIONS
Not may take several weeks to resolve.  May do warm compresses as instructed.  May do compression wrap as needed.  Follow-up as needed.

## 2024-05-12 NOTE — PROGRESS NOTES
[facility]    NAME: Matilda Parker is a 48 y.o. female  : 1975    MRN: [id number]  DATE: May 12, 2024  TIME: 2:48 PM    Assessment and Plan   Traumatic hematoma of left lower leg, initial encounter [S80.12XA]  1. Traumatic hematoma of left lower leg, initial encounter            Patient Instructions     Patient Instructions   Not may take several weeks to resolve.  May do warm compresses as instructed.  May do compression wrap as needed.  Follow-up as needed.    Chief Complaint     Chief Complaint   Patient presents with    Leg Injury     Left leg injury from bumping into a railroad spike in her back yard. Was scratched but is more concerned about the hard lump that formed the week following the injury. Bruising around the injury site.        History of Present Illness   Matilda Parker presents to the clinic c/o  48-year-old female comes in wanting a lump on the back of her left calf checked.  About a week ago she was working out in her yard and fell backwards and she struck her left calf on a railroad tie causing injury.  Minimal breakage of skin.  Large bruise.  She is concerned about the hard lump that is still there.  Does not seem to be worsening.  No difficulty walking.  No chest pain or shortness of breath.  Works out routinely and is doing this without difficulty.  Just came in to get area checked since not was still there.    No history of abnormal blood clotting or bleeding disorders.  Quit smoking back in March.        Review of Systems   Review of Systems   Constitutional: Negative.    Skin:  Positive for color change.       Current Medications     Long-Term Medications   Medication Sig Dispense Refill    ALPRAZolam (XANAX) 0.5 mg tablet Take 0.5 mg by mouth         Current Allergies     Allergies as of 2024    (No Known Allergies)          The following portions of the patient's history were reviewed and updated as appropriate: allergies, current medications, past family history, past  "medical history, past social history, past surgical history and problem list.  History reviewed. No pertinent past medical history.  Past Surgical History:   Procedure Laterality Date    SKIN CANCER EXCISION      TONSILLECTOMY       Family History   Problem Relation Age of Onset    Thyroid disease Mother     Hypertension Father        Objective   /54   Pulse 67   Temp (!) 96.7 °F (35.9 °C)   Resp 16   Ht 5' 5\" (1.651 m)   Wt 75.6 kg (166 lb 10.7 oz)   SpO2 98%   BMI 27.73 kg/m²   No LMP recorded. Patient is perimenopausal.       Physical Exam     Physical Exam  Vitals and nursing note reviewed.   Constitutional:       General: She is not in acute distress.     Appearance: Normal appearance. She is well-developed. She is not ill-appearing, toxic-appearing or diaphoretic.      Comments: Accompanied by fiancé   Cardiovascular:      Rate and Rhythm: Normal rate.   Pulmonary:      Effort: Pulmonary effort is normal. No respiratory distress.   Musculoskeletal:         General: Swelling and signs of injury present. No tenderness.      Comments: Hematoma as described under skin section noted.  Gastrocnemius nontender to palpation.  Good gait without difficulty.   Skin:     General: Skin is warm and dry.      Findings: Bruising present.      Comments: Posterior left calf, superolateral aspect with palpable hematoma approximately 3.5 cm diameter nontender to palpation with a outlining fading yellow contusion.  Slight abrasion midline.  No redness, heat, induration.  See photo.   Neurological:      Mental Status: She is alert and oriented to person, place, and time.   Psychiatric:         Mood and Affect: Mood normal.         Behavior: Behavior normal.             "

## 2024-06-17 DIAGNOSIS — Z00.6 ENCOUNTER FOR EXAMINATION FOR NORMAL COMPARISON OR CONTROL IN CLINICAL RESEARCH PROGRAM: ICD-10-CM

## 2024-06-26 ENCOUNTER — APPOINTMENT (OUTPATIENT)
Age: 49
End: 2024-06-26

## 2024-06-26 DIAGNOSIS — Z00.6 ENCOUNTER FOR EXAMINATION FOR NORMAL COMPARISON OR CONTROL IN CLINICAL RESEARCH PROGRAM: ICD-10-CM

## 2024-06-26 PROCEDURE — 36415 COLL VENOUS BLD VENIPUNCTURE: CPT

## 2024-07-06 LAB
APOB+LDLR+PCSK9 GENE MUT ANL BLD/T: NOT DETECTED
BRCA1+BRCA2 DEL+DUP + FULL MUT ANL BLD/T: NOT DETECTED
MLH1+MSH2+MSH6+PMS2 GN DEL+DUP+FUL M: NOT DETECTED

## 2024-08-27 ENCOUNTER — APPOINTMENT (EMERGENCY)
Dept: CT IMAGING | Facility: HOSPITAL | Age: 49
End: 2024-08-27
Payer: OTHER GOVERNMENT

## 2024-08-27 ENCOUNTER — HOSPITAL ENCOUNTER (EMERGENCY)
Facility: HOSPITAL | Age: 49
Discharge: HOME | End: 2024-08-27
Attending: EMERGENCY MEDICINE
Payer: OTHER GOVERNMENT

## 2024-08-27 ENCOUNTER — OFFICE VISIT (OUTPATIENT)
Age: 49
End: 2024-08-27
Payer: OTHER GOVERNMENT

## 2024-08-27 VITALS
WEIGHT: 164.02 LBS | TEMPERATURE: 97.8 F | RESPIRATION RATE: 18 BRPM | SYSTOLIC BLOOD PRESSURE: 126 MMHG | HEART RATE: 91 BPM | DIASTOLIC BLOOD PRESSURE: 60 MMHG | OXYGEN SATURATION: 98 % | HEIGHT: 65 IN | BODY MASS INDEX: 27.33 KG/M2

## 2024-08-27 VITALS
DIASTOLIC BLOOD PRESSURE: 70 MMHG | HEART RATE: 71 BPM | TEMPERATURE: 98.3 F | RESPIRATION RATE: 18 BRPM | OXYGEN SATURATION: 98 % | SYSTOLIC BLOOD PRESSURE: 116 MMHG | BODY MASS INDEX: 27.44 KG/M2 | WEIGHT: 164.68 LBS | HEIGHT: 65 IN

## 2024-08-27 DIAGNOSIS — R10.13 EPIGASTRIC ABDOMINAL PAIN: Primary | ICD-10-CM

## 2024-08-27 DIAGNOSIS — R10.10 PAIN OF UPPER ABDOMEN: Primary | ICD-10-CM

## 2024-08-27 LAB
ALBUMIN SERPL BCG-MCNC: 3.9 G/DL (ref 3.5–5)
ALP SERPL-CCNC: 58 U/L (ref 34–104)
ALT SERPL W P-5'-P-CCNC: 23 U/L (ref 7–52)
ANION GAP SERPL CALCULATED.3IONS-SCNC: 4 MMOL/L (ref 4–13)
AST SERPL W P-5'-P-CCNC: 26 U/L (ref 13–39)
BASOPHILS # BLD AUTO: 0.05 THOUSANDS/ÂΜL (ref 0–0.1)
BASOPHILS NFR BLD AUTO: 1 % (ref 0–1)
BILIRUB DIRECT SERPL-MCNC: 0.05 MG/DL (ref 0–0.2)
BILIRUB SERPL-MCNC: 0.26 MG/DL (ref 0.2–1)
BUN SERPL-MCNC: 10 MG/DL (ref 5–25)
CALCIUM SERPL-MCNC: 8.8 MG/DL (ref 8.4–10.2)
CHLORIDE SERPL-SCNC: 106 MMOL/L (ref 96–108)
CO2 SERPL-SCNC: 27 MMOL/L (ref 21–32)
CREAT SERPL-MCNC: 0.57 MG/DL (ref 0.6–1.3)
EOSINOPHIL # BLD AUTO: 0.05 THOUSAND/ÂΜL (ref 0–0.61)
EOSINOPHIL NFR BLD AUTO: 1 % (ref 0–6)
ERYTHROCYTE [DISTWIDTH] IN BLOOD BY AUTOMATED COUNT: 12.4 % (ref 11.6–15.1)
EXT PREGNANCY TEST URINE: NEGATIVE
EXT. CONTROL: NORMAL
GFR SERPL CREATININE-BSD FRML MDRD: 110 ML/MIN/1.73SQ M
GLUCOSE SERPL-MCNC: 89 MG/DL (ref 65–140)
HCT VFR BLD AUTO: 39.6 % (ref 34.8–46.1)
HGB BLD-MCNC: 12.8 G/DL (ref 11.5–15.4)
IMM GRANULOCYTES # BLD AUTO: 0.03 THOUSAND/UL (ref 0–0.2)
IMM GRANULOCYTES NFR BLD AUTO: 1 % (ref 0–2)
LIPASE SERPL-CCNC: 24 U/L (ref 11–82)
LYMPHOCYTES # BLD AUTO: 1.99 THOUSANDS/ÂΜL (ref 0.6–4.47)
LYMPHOCYTES NFR BLD AUTO: 30 % (ref 14–44)
MCH RBC QN AUTO: 28.8 PG (ref 26.8–34.3)
MCHC RBC AUTO-ENTMCNC: 32.3 G/DL (ref 31.4–37.4)
MCV RBC AUTO: 89 FL (ref 82–98)
MONOCYTES # BLD AUTO: 0.48 THOUSAND/ÂΜL (ref 0.17–1.22)
MONOCYTES NFR BLD AUTO: 7 % (ref 4–12)
NEUTROPHILS # BLD AUTO: 4.05 THOUSANDS/ÂΜL (ref 1.85–7.62)
NEUTS SEG NFR BLD AUTO: 60 % (ref 43–75)
NRBC BLD AUTO-RTO: 0 /100 WBCS
PLATELET # BLD AUTO: 211 THOUSANDS/UL (ref 149–390)
PMV BLD AUTO: 11.6 FL (ref 8.9–12.7)
POTASSIUM SERPL-SCNC: 4.2 MMOL/L (ref 3.5–5.3)
PROT SERPL-MCNC: 6.5 G/DL (ref 6.4–8.4)
RBC # BLD AUTO: 4.45 MILLION/UL (ref 3.81–5.12)
SODIUM SERPL-SCNC: 137 MMOL/L (ref 135–147)
WBC # BLD AUTO: 6.65 THOUSAND/UL (ref 4.31–10.16)

## 2024-08-27 PROCEDURE — 99213 OFFICE O/P EST LOW 20 MIN: CPT

## 2024-08-27 PROCEDURE — 80076 HEPATIC FUNCTION PANEL: CPT

## 2024-08-27 PROCEDURE — 80048 BASIC METABOLIC PNL TOTAL CA: CPT

## 2024-08-27 PROCEDURE — 36415 COLL VENOUS BLD VENIPUNCTURE: CPT

## 2024-08-27 PROCEDURE — 74177 CT ABD & PELVIS W/CONTRAST: CPT

## 2024-08-27 PROCEDURE — 83690 ASSAY OF LIPASE: CPT

## 2024-08-27 PROCEDURE — 99285 EMERGENCY DEPT VISIT HI MDM: CPT

## 2024-08-27 PROCEDURE — 99284 EMERGENCY DEPT VISIT MOD MDM: CPT

## 2024-08-27 PROCEDURE — G0463 HOSPITAL OUTPT CLINIC VISIT: HCPCS

## 2024-08-27 PROCEDURE — 85025 COMPLETE CBC W/AUTO DIFF WBC: CPT

## 2024-08-27 PROCEDURE — 81025 URINE PREGNANCY TEST: CPT

## 2024-08-27 RX ADMIN — IOHEXOL 100 ML: 350 INJECTION, SOLUTION INTRAVENOUS at 15:41

## 2024-08-27 NOTE — ED PROVIDER NOTES
History  Chief Complaint   Patient presents with    Abdominal Pain     Pt reports LUQ pain for a few days, some nausea, fullness felling. Denies vomiting/diarrhea      49 YOF presents today due to epigastric abd pain x5 days. Pt reports that pain has been more constant. States that it does not get worse with eating or drinking but she has noticed she cannot eat or drink as much, gets full faster. Reports some nausea. States she has tried taking Tums, Maalox and tried Dulcolax without relief. Denies fever, chills, vomiting, diarrhea, constipation, chest pain, SOB.         Prior to Admission Medications   Prescriptions Last Dose Informant Patient Reported? Taking?   ALPRAZolam (XANAX) 0.5 mg tablet   Yes Yes   Sig: Take 0.5 mg by mouth   albuterol (PROVENTIL HFA,VENTOLIN HFA) 90 mcg/act inhaler   Yes Yes   Sig: inhale 1 to 2 puffs INTO THE LUNGS every 6 hours if needed      Facility-Administered Medications: None       History reviewed. No pertinent past medical history.    Past Surgical History:   Procedure Laterality Date    SKIN CANCER EXCISION      TONSILLECTOMY         Family History   Problem Relation Age of Onset    Thyroid disease Mother     Hypertension Father      I have reviewed and agree with the history as documented.    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   HENT:  Negative for congestion.    Respiratory:  Negative for cough and shortness of  breath.    Cardiovascular:  Negative for chest pain and palpitations.   Gastrointestinal:  Positive for abdominal pain and nausea. Negative for constipation, diarrhea and vomiting.   Genitourinary:  Negative for dysuria, flank pain, frequency, hematuria and urgency.   All other systems reviewed and are negative.      Physical Exam  Physical Exam  Vitals and nursing note reviewed.   Constitutional:       General: She is not in acute distress.     Appearance: She is well-developed. She is not ill-appearing.   HENT:      Head: Normocephalic and atraumatic.   Eyes:      Conjunctiva/sclera: Conjunctivae normal.   Cardiovascular:      Rate and Rhythm: Normal rate and regular rhythm.      Heart sounds: No murmur heard.  Pulmonary:      Effort: Pulmonary effort is normal. No respiratory distress.      Breath sounds: Normal breath sounds.   Abdominal:      Palpations: Abdomen is soft.      Tenderness: There is abdominal tenderness in the right upper quadrant and epigastric area.   Musculoskeletal:         General: No swelling.      Cervical back: Neck supple.   Skin:     General: Skin is warm and dry.      Capillary Refill: Capillary refill takes less than 2 seconds.   Neurological:      Mental Status: She is alert.   Psychiatric:         Mood and Affect: Mood normal.         Vital Signs  ED Triage Vitals [08/27/24 1455]   Temperature Pulse Respirations Blood Pressure SpO2   98.3 °F (36.8 °C) 71 18 116/70 98 %      Temp Source Heart Rate Source Patient Position - Orthostatic VS BP Location FiO2 (%)   Oral Monitor Sitting Right arm --      Pain Score       5           Vitals:    08/27/24 1455   BP: 116/70   Pulse: 71   Patient Position - Orthostatic VS: Sitting         Visual Acuity      ED Medications  Medications   iohexol (OMNIPAQUE) 350 MG/ML injection (SINGLE-DOSE) 100 mL (100 mL Intravenous Given 8/27/24 1541)       Diagnostic Studies  Results Reviewed       Procedure Component Value Units Date/Time    Basic metabolic  panel [[id number]]  (Abnormal) Collected: 08/27/24 1524    Lab Status: Final result Specimen: Blood from Arm, Left Updated: 08/27/24 1606     Sodium 137 mmol/L      Potassium 4.2 mmol/L      Chloride 106 mmol/L      CO2 27 mmol/L      ANION GAP 4 mmol/L      BUN 10 mg/dL      Creatinine 0.57 mg/dL      Glucose 89 mg/dL      Calcium 8.8 mg/dL      eGFR 110 ml/min/1.73sq m     Narrative:      National Kid[employer] Foundation guidelines for Chronic Kidney Disease (CKD):     Stage 1 with normal or high GFR (GFR > 90 mL/min/1.73 square meters)    Stage 2 Mild CKD (GFR = 60-89 mL/min/1.73 square meters)    Stage 3A Moderate CKD (GFR = 45-59 mL/min/1.73 square meters)    Stage 3B Moderate CKD (GFR = 30-44 mL/min/1.73 square meters)    Stage 4 Severe CKD (GFR = 15-29 mL/min/1.73 square meters)    Stage 5 End Stage CKD (GFR <15 mL/min/1.73 square meters)  Note: GFR calculation is accurate only with a steady state creatinine    Hepatic function panel [[id number]]  (Normal) Collected: 08/27/24 1524    Lab Status: Final result Specimen: Blood from Arm, Left Updated: 08/27/24 1606     Total Bilirubin 0.26 mg/dL      Bilirubin, Direct 0.05 mg/dL      Alkaline Phosphatase 58 U/L      AST 26 U/L      ALT 23 U/L      Total Protein 6.5 g/dL      Albumin 3.9 g/dL     Lipase [[id number]]  (Normal) Collected: 08/27/24 1524    Lab Status: Final result Specimen: Blood from Arm, Left Updated: 08/27/24 1606     Lipase 24 u/L     CBC and differential [[id number]] Collected: 08/27/24 1524    Lab Status: Final result Specimen: Blood from Arm, Left Updated: 08/27/24 1532     WBC 6.65 Thousand/uL      RBC 4.45 Million/uL      Hemoglobin 12.8 g/dL      Hematocrit 39.6 %      MCV 89 fL      MCH 28.8 pg      MCHC 32.3 g/dL      RDW 12.4 %      MPV 11.6 fL      Platelets 211 Thousands/uL      nRBC 0 /100 WBCs      Segmented % 60 %      Immature Grans % 1 %      Lymphocytes % 30 %      Monocytes % 7 %      Eosinophils Relative 1 %      Basophils  Relative 1 %      Absolute Neutrophils 4.05 Thousands/µL      Absolute Immature Grans 0.03 Thousand/uL      Absolute Lymphocytes 1.99 Thousands/µL      Absolute Monocytes 0.48 Thousand/µL      Eosinophils Absolute 0.05 Thousand/µL      Basophils Absolute 0.05 Thousands/µL     POCT pregnancy, urine [[id number]]  (Normal) Resulted: 08/27/24 1528    Lab Status: Final result Updated: 08/27/24 1528     EXT Preg Test, Ur Negative     Control Valid                   CT abdomen pelvis w contrast   Final Result by Crystal Linares MD (08/27 1603)      1) No acute abdominal or pelvic pathology.      2) No CT evidence of acute pancreatitis or cholecystitis. No biliary dilatation. No radiopaque choledocholithiasis.      3) No bowel obstruction. Evaluation for bowel inflammation somewhat limited by underdistention and lack of oral contrast, however no convincing inflammatory changes. Please note mild inflammation may not be apparent.      4) Small amount of free fluid in the cul-de-sac, likely physiologic.      5) Additional findings as above.                  Workstation performed: [id number]                    Procedures  Procedures         ED Course  ED Course as of 08/27/24 1630   Tue Aug 27, 2024   1614 CT abdomen pelvis w contrast  1) No acute abdominal or pelvic pathology.     2) No CT evidence of acute pancreatitis or cholecystitis. No biliary dilatation. No radiopaque choledocholithiasis.     3) No bowel obstruction. Evaluation for bowel inflammation somewhat limited by underdistention and lack of oral contrast, however no convincing inflammatory changes. Please note mild inflammation may not be apparent.     4) Small amount of free fluid in the cul-de-sac, likely physiologic.     5) Additional findings as above                                   SBIRT 20yo+      Flowsheet Row Most Recent Value   Initial Alcohol Screen: US AUDIT-C     1. How often do you have a drink containing alcohol? 0 Filed at: 08/27/2024 1639   2. How  many drinks containing alcohol do you have on a typical day you are drinking?  0 Filed at: 08/27/2024 1503   3a. Male UNDER 65: How often do you have five or more drinks on one occasion? 0 Filed at: 08/27/2024 1503   3b. FEMALE Any Age, or MALE 65+: How often do you have 4 or more drinks on one occassion? 0 Filed at: 08/27/2024 1503   Audit-C Score 0 Filed at: 08/27/2024 1503   RADHA: How many times in the past year have you...    Used an illegal drug or used a prescription medication for non-medical reasons? Never Filed at: 08/27/2024 1503                      Medical Decision Making  DDx including but not limited to: appendicitis, gastritis, PUD, GERD, hepatitis, pancreatitis, cholecystitis, diverticulitis    CT abd/pelvis showed none of the above findings. Likely GERD vs PUD. Will start on PPI and refer to GI    I have discussed the plan to discharge pt from ED. The patient was discharged in stable condition.  Patient ambulated off the department.  Extensive return to emergency department precautions were discussed.  Follow up with appropriate providers including primary care physician was discussed.  Patient and/or their  primary decision maker expressed understanding.  Patient remained stable during entire emergency department stay.        Amount and/or Complexity of Data Reviewed  Labs: ordered.  Radiology: ordered. Decision-making details documented in ED Course.    Risk  Prescription drug management.                 Disposition  Final diagnoses:   Epigastric abdominal pain     Time reflects when diagnosis was documented in both MDM as applicable and the Disposition within this note       Time User Action Codes Description Comment    8/27/2024  4:15 PM Prateek Altman Add [R10.13] Epigastric abdominal pain           ED Disposition       ED Disposition   Discharge    Condition   Stable    Date/Time   Tue Aug 27, 2024 1614    Comment   Matilda Parker discharge to home/self care.                   Follow-up  Information       Follow up With Specialties Details Why Contact Info Additional Information    [facility] Schedule an appointment as soon as possible for a visit   [address]  [facility] [address] [phone] [facility] [place], [address], [address], [place], [place], [address] [phone]            Discharge Medication List as of 8/27/2024  4:15 PM        START taking these medications    Details   omeprazole (PriLOSEC) 20 mg delayed release capsule Take 1 capsule (20 mg total) by mouth daily for 14 days, Starting Tue 8/27/2024, Until Tue 9/10/2024, Normal           CONTINUE these medications which have NOT CHANGED    Details   albuterol (PROVENTIL HFA,VENTOLIN HFA) 90 mcg/act inhaler inhale 1 to 2 puffs INTO THE LUNGS every 6 hours if needed, Historical Med      ALPRAZolam (XANAX) 0.5 mg tablet Take 0.5 mg by mouth, Historical Med                 PDMP Review       None            ED Provider  Electronically Signed by             Prateek Altman PA-C  08/27/24 8984

## 2024-08-27 NOTE — Clinical Note
Matilda Parker was seen and treated in our emergency department on 8/27/2024.                Diagnosis:     Matilda  may return to work on return date.    She may return on this date: 08/29/2024         If you have any questions or concerns, please don't hesitate to call.      Natalie Shields RN    ______________________________           _______________          _______________  Hospital Representative                              Date                                Time Quality 110: Preventive Care And Screening: Influenza Immunization: Influenza Immunization Administered during Influenza season Detail Level: Detailed Quality 226: Preventive Care And Screening: Tobacco Use: Screening And Cessation Intervention: Patient screened for tobacco use and is an ex/non-smoker Quality 130: Documentation Of Current Medications In The Medical Record: Current Medications Documented Quality 431: Preventive Care And Screening: Unhealthy Alcohol Use - Screening: Unhealthy alcohol use screening not performed, reason not otherwise specified

## 2024-08-27 NOTE — PROGRESS NOTES
[facility]        NAME: Matilda Parker is a 49 y.o. female  : 1975    MRN: [id number]  DATE: 2024  TIME: 1:19 PM    Assessment and Plan   Pain of upper abdomen [R10.10]  1. Pain of upper abdomen  Transfer to other facility            Patient Instructions     Proceed to the [place] [facility] for further evaluation.    Chief Complaint     Chief Complaint   Patient presents with   • Abdominal Pain     X5 days. Went to ER last night and left without being seen after waiting 5 hours. Pain is mid upper and constant 6/10. No diarrhea, no vomiting. Some nausea. Denies change of pregnancy. No fevers.         History of Present Illness       Abdominal pain that is constant starting about 5 days ago in her mid upper abdomen.  States that pain can occur randomly and is not related to eating or drinking that she has noticed.  Reports some nausea but no vomiting or diarrhea.  Reports some constipation.  States that she had taken Tums Maalox and tried Dulcolax without relief.  States she was at an ER yesterday however she left before being seen by provider.        Review of Systems   Review of Systems   Constitutional:  Negative for chills and fever.   HENT:  Negative for ear pain and sore throat.    Eyes:  Negative for pain and visual disturbance.   Respiratory:  Negative for cough and shortness of breath.    Cardiovascular:  Negative for chest pain and palpitations.   Gastrointestinal:  Positive for abdominal pain, constipation and nausea. Negative for vomiting.   Genitourinary:  Negative for dysuria and hematuria.   Musculoskeletal:  Negative for arthralgias and back pain.   Skin:  Negative for color change and rash.   Neurological:  Negative for dizziness, seizures, syncope, weakness and numbness.   All other systems reviewed and are negative.        Current Medications       Current Outpatient Medications:   •  albuterol (PROVENTIL HFA,VENTOLIN HFA) 90 mcg/act inhaler, inhale 1 to 2 puffs  "INTO THE LUNGS every 6 hours if needed, Disp: , Rfl:   •  ALPRAZolam (XANAX) 0.5 mg tablet, Take 0.5 mg by mouth, Disp: , Rfl:     Current Allergies     Allergies as of 08/27/2024   • (No Known Allergies)            The following portions of the patient's history were reviewed and updated as appropriate: allergies, current medications, past family history, past medical history, past social history, past surgical history and problem list.     History reviewed. No pertinent past medical history.    Past Surgical History:   Procedure Laterality Date   • SKIN CANCER EXCISION     • TONSILLECTOMY         Family History   Problem Relation Age of Onset   • Thyroid disease Mother    • Hypertension Father          Medications have been verified.        Objective   /60   Pulse 91   Temp 97.8 °F (36.6 °C)   Resp 18   Ht 5' 5\" (1.651 m)   Wt 74.4 kg (164 lb 0.4 oz)   LMP 08/06/2024 (Approximate)   SpO2 98%   BMI 27.29 kg/m²   Patient's last menstrual period was 08/06/2024 (approximate).       Physical Exam     Physical Exam  Vitals and nursing note reviewed.   Constitutional:       Appearance: Normal appearance.   HENT:      Head: Normocephalic and atraumatic.   Pulmonary:      Effort: Pulmonary effort is normal.   Abdominal:      Tenderness: There is abdominal tenderness in the right upper quadrant and epigastric area. There is guarding. There is no rebound. Positive signs include Ma's sign. Negative signs include Rovsing's sign, McBurney's sign and obturator sign.   Skin:     General: Skin is warm and dry.      Capillary Refill: Capillary refill takes less than 2 seconds.   Neurological:      General: No focal deficit present.      Mental Status: She is alert and oriented to person, place, and time. Mental status is at baseline.      Sensory: No sensory deficit.      Motor: No weakness.   Psychiatric:         Mood and Affect: Mood normal.         Behavior: Behavior normal.         Thought Content: Thought " content normal.

## 2025-02-10 ENCOUNTER — APPOINTMENT (OUTPATIENT)
Dept: URGENT CARE | Facility: CLINIC | Age: 50
End: 2025-02-10

## 2025-02-10 ENCOUNTER — APPOINTMENT (OUTPATIENT)
Dept: LAB | Facility: CLINIC | Age: 50
End: 2025-02-10

## 2025-02-10 DIAGNOSIS — Z02.1 PRE-EMPLOYMENT EXAMINATION: ICD-10-CM

## 2025-02-10 PROCEDURE — 36415 COLL VENOUS BLD VENIPUNCTURE: CPT

## 2025-02-10 PROCEDURE — 86480 TB TEST CELL IMMUN MEASURE: CPT

## 2025-02-11 LAB
GAMMA INTERFERON BACKGROUND BLD IA-ACNC: 0.02 IU/ML
M TB IFN-G BLD-IMP: NEGATIVE
M TB IFN-G CD4+ BCKGRND COR BLD-ACNC: 0 IU/ML
M TB IFN-G CD4+ BCKGRND COR BLD-ACNC: 0.01 IU/ML
MITOGEN IGNF BCKGRD COR BLD-ACNC: 9.98 IU/ML

## 2025-05-03 ENCOUNTER — OFFICE VISIT (OUTPATIENT)
Age: 50
End: 2025-05-03
Payer: COMMERCIAL

## 2025-05-03 VITALS
HEIGHT: 65 IN | WEIGHT: 167 LBS | DIASTOLIC BLOOD PRESSURE: 60 MMHG | RESPIRATION RATE: 18 BRPM | OXYGEN SATURATION: 98 % | TEMPERATURE: 97.4 F | BODY MASS INDEX: 27.82 KG/M2 | HEART RATE: 64 BPM | SYSTOLIC BLOOD PRESSURE: 118 MMHG

## 2025-05-03 DIAGNOSIS — S16.1XXA STRAIN OF NECK MUSCLE, INITIAL ENCOUNTER: Primary | ICD-10-CM

## 2025-05-03 DIAGNOSIS — M25.512 ACUTE PAIN OF LEFT SHOULDER: ICD-10-CM

## 2025-05-03 PROCEDURE — G0382 LEV 3 HOSP TYPE B ED VISIT: HCPCS | Performed by: EMERGENCY MEDICINE

## 2025-05-03 RX ORDER — CYCLOBENZAPRINE HCL 10 MG
10 TABLET ORAL 3 TIMES DAILY PRN
Qty: 20 TABLET | Refills: 0 | Status: SHIPPED | OUTPATIENT
Start: 2025-05-03
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
## 2025-06-16 ENCOUNTER — OFFICE VISIT (OUTPATIENT)
Age: 50
End: 2025-06-16
Payer: OTHER GOVERNMENT

## 2025-06-16 VITALS
SYSTOLIC BLOOD PRESSURE: 120 MMHG | OXYGEN SATURATION: 99 % | HEART RATE: 69 BPM | BODY MASS INDEX: 28.32 KG/M2 | DIASTOLIC BLOOD PRESSURE: 74 MMHG | WEIGHT: 170 LBS | HEIGHT: 65 IN | RESPIRATION RATE: 16 BRPM | TEMPERATURE: 97 F

## 2025-06-16 DIAGNOSIS — R22.42 LOCALIZED SWELLING OF LEFT LOWER LEG: Primary | ICD-10-CM

## 2025-06-16 PROCEDURE — 99213 OFFICE O/P EST LOW 20 MIN: CPT | Performed by: NURSE PRACTITIONER

## 2025-06-16 NOTE — PATIENT INSTRUCTIONS
Continue to monitor symptoms  Ibuprofen as needed for pain  Elevated legs while sitting  If worsening symptoms to your left lower leg, such as increased swelling, pain, redness, please go to the Emergency room for evaluation for a blood clot

## 2025-06-16 NOTE — PROGRESS NOTES
[facility]        NAME: Matilda Parker is a 50 y.o. female  : 1975    MRN: [id number]  DATE: 2025  TIME: 4:55 PM    Assessment and Plan   Localized swelling of left lower leg [R22.42]  1. Localized swelling of left lower leg          Discussed with patient area appears to be a bruise with underlying bump. Posterior calf is soft and non tender, discussed return precautions for DVT. Patient comfortable to continue to monitor symptoms at this time. If worsening swelling to left calf, patient will go to the Emergency room.     Patient Instructions       Follow up with PCP in 3-5 days.  Proceed to  ER if symptoms worsen.    If tests have been performed at [facility], our office will contact you with results if changes need to be made to the care plan discussed with you at the visit.  [facility]    Chief Complaint     Chief Complaint   Patient presents with   • bump on leg     Pt presents with a bump/some redness on her left leg with some leg swelling, is a little itchy- first noticed on Saturday         History of Present Illness       Started: 2-3 days  Positive: left lower leg with mild swelling and a bruise  Reports felt left lower leg was getting more swollen today  Treatment: none  Denies hx of DVT        Review of Systems   Review of Systems   Musculoskeletal:  Positive for joint swelling.   Skin:  Positive for color change.         Current Medications     Current Medications[1]    Current Allergies     Allergies as of 2025   • (No Known Allergies)            The following portions of the patient's history were reviewed and updated as appropriate: allergies, current medications, past family history, past medical history, past social history, past surgical history and problem list.     Past Medical History[2]    Past Surgical History[3]    Family History[4]      Medications have been verified.        Objective   /74   Pulse 69   Temp  "(!) 97 °F (36.1 °C)   Resp 16   Ht 5' 5\" (1.651 m)   Wt 77.1 kg (170 lb)   LMP 08/06/2024 (Approximate)   SpO2 99%   BMI 28.29 kg/m²   Patient's last menstrual period was 08/06/2024 (approximate).       Physical Exam     Physical Exam  Constitutional:       General: She is not in acute distress.     Appearance: Normal appearance. She is not ill-appearing.   HENT:      Head: Normocephalic and atraumatic.     Cardiovascular:      Rate and Rhythm: Normal rate.   Pulmonary:      Effort: Pulmonary effort is normal.     Musculoskeletal:        Legs:      Skin:     General: Skin is warm and dry.     Neurological:      Mental Status: She is alert.                          [1]    Current Outpatient Medications:   •  albuterol (PROVENTIL HFA,VENTOLIN HFA) 90 mcg/act inhaler, , Disp: , Rfl:   •  ALPRAZolam (XANAX) 0.5 mg tablet, Take 0.5 mg by mouth, Disp: , Rfl:   •  cyclobenzaprine (FLEXERIL) 10 mg tablet, Take 1 tablet (10 mg total) by mouth 3 (three) times a day as needed for muscle spasms (Patient not taking: Reported on 6/16/2025), Disp: 20 tablet, Rfl: 0  •  omeprazole (PriLOSEC) 20 mg delayed release capsule, Take 1 capsule (20 mg total) by mouth daily for 14 days, Disp: 14 capsule, Rfl: 0[2]  No past medical history on file.[3]  Past Surgical History:  Procedure Laterality Date   • SKIN CANCER EXCISION     • TONSILLECTOMY     [4]  Family History  Problem Relation Name Age of Onset   • Thyroid disease Mother     • Hypertension Father     "

## 2025-06-25 ENCOUNTER — OFFICE VISIT (OUTPATIENT)
Age: 50
End: 2025-06-25
Payer: OTHER GOVERNMENT

## 2025-06-25 VITALS
TEMPERATURE: 98.7 F | BODY MASS INDEX: 27.66 KG/M2 | HEIGHT: 65 IN | SYSTOLIC BLOOD PRESSURE: 124 MMHG | OXYGEN SATURATION: 98 % | RESPIRATION RATE: 16 BRPM | DIASTOLIC BLOOD PRESSURE: 72 MMHG | WEIGHT: 166 LBS | HEART RATE: 93 BPM

## 2025-06-25 DIAGNOSIS — L23.7 POISON IVY DERMATITIS: Primary | ICD-10-CM

## 2025-06-25 PROCEDURE — G0463 HOSPITAL OUTPT CLINIC VISIT: HCPCS | Performed by: EMERGENCY MEDICINE

## 2025-06-25 PROCEDURE — 99213 OFFICE O/P EST LOW 20 MIN: CPT | Performed by: EMERGENCY MEDICINE

## 2025-06-25 RX ORDER — PREDNISONE 10 MG/1
TABLET ORAL
Qty: 21 TABLET | Refills: 0 | Status: SHIPPED | OUTPATIENT
Start: 2025-06-25

## 2025-06-25 RX ORDER — MONTELUKAST SODIUM 5 MG/1
TABLET, CHEWABLE ORAL
COMMUNITY
Start: 2025-03-18

## 2025-06-25 NOTE — PATIENT INSTRUCTIONS
POISON IVY  Use ice pack or cold compresses to avoid scratching  Take an H1 antihistamine like Benadryl or non-sedating antihistamine like Zyrtec, Allegra or Claritin, AND an H2 antihistamine like Pepcid or Zantac for itch.  If you are diabetic you should adhere strictly to your diabetic diet and monitor blood sugar closely while on prednisone and you should discontinue the prednisone if blood sugar becomes significantly elevated.  Avoid nonsteroidal anti-inflammatories like Advil or Aleve while on prednisone.   Use Calomine only, not Calodryl as discussed.  Watch the video by “[employer]” called “How to avoid getting poison ivy ever again”  POISON IVY DERMATITIS  WHAT YOU NEED TO KNOW:   Contact dermatitis is a skin rash. It develops when you touch something that irritates your skin or causes an allergic reaction.  DISCHARGE INSTRUCTIONS:   Call your local emergency number (911 in the [place]) if:   You have sudden trouble breathing.    Your throat swells and you have trouble eating.    Your face is swollen.    Call your doctor or dermatologist if:   You have a fever.    Your blisters are draining pus.    Your rash spreads or does not get better, even after treatment.    You have questions or concerns about your condition or care.    Medicines:   Medicines  help decrease itching and swelling. They will be given as a topical medicine to apply to your rash or as a pill.    Take your medicine as directed.  Contact your healthcare provider if you think your medicine is not helping or if you have side effects. Tell him or her if you are allergic to any medicine. Keep a list of the medicines, vitamins, and herbs you take. Include the amounts, and when and why you take them. Bring the list or the pill bottles to follow-up visits. Carry your medicine list with you in case of an emergency.    Manage contact dermatitis:   Take short baths or showers in cool water.  Use mild soap or soap-free cleansers. Add oatmeal,  baking soda, or cornstarch to the bath water to help decrease skin irritation.    Avoid skin irritants , such as makeup, hair products, soaps, and cleansers. Use products that do not contain perfume or dye.    Apply a cool compress to your rash.  This will help soothe your skin.    Apply lotions or creams to the area.  These help keep your skin moist and decrease itching. Apply the lotion or cream right after a lukewarm bath or shower when your skin is still damp. Use products that do not contain a scent.    Follow up with your doctor or dermatologist in 2 to 3 days:  Write down your questions so you remember to ask them during your visits.  © Copyright [employer] 2022 Information is for End User's use only and may not be sold, redistributed or otherwise used for commercial purposes. All illustrations and images included in CareNotes® are the copyrighted property of [employer]. or [employer]  The above information is an  only. It is not intended as medical advice for individual conditions or treatments. Talk to your doctor, nurse or pharmacist before following any medical regimen to see if it is safe and effective for you.

## 2025-06-25 NOTE — PROGRESS NOTES
[facility]        NAME: Matilda Parker is a 50 y.o. female  : 1975    MRN: [id number]  DATE: 2025  TIME: 1:37 PM    Assessment and Plan   Poison ivy dermatitis [L23.7]  1. Poison ivy dermatitis  predniSONE 10 mg tablet            Patient Instructions     Patient Instructions   POISON IVY  Use ice pack or cold compresses to avoid scratching  Take an H1 antihistamine like Benadryl or non-sedating antihistamine like Zyrtec, Allegra or Claritin, AND an H2 antihistamine like Pepcid or Zantac for itch.  If you are diabetic you should adhere strictly to your diabetic diet and monitor blood sugar closely while on prednisone and you should discontinue the prednisone if blood sugar becomes significantly elevated.  Avoid nonsteroidal anti-inflammatories like Advil or Aleve while on prednisone.   Use Calomine only, not Calodryl as discussed.  Watch the video by “[employer]” called “How to avoid getting poison ivy ever again”  POISON IVY DERMATITIS  WHAT YOU NEED TO KNOW:   Contact dermatitis is a skin rash. It develops when you touch something that irritates your skin or causes an allergic reaction.  DISCHARGE INSTRUCTIONS:   Call your local emergency number (911 in the [place]) if:   You have sudden trouble breathing.    Your throat swells and you have trouble eating.    Your face is swollen.    Call your doctor or dermatologist if:   You have a fever.    Your blisters are draining pus.    Your rash spreads or does not get better, even after treatment.    You have questions or concerns about your condition or care.    Medicines:   Medicines  help decrease itching and swelling. They will be given as a topical medicine to apply to your rash or as a pill.    Take your medicine as directed.  Contact your healthcare provider if you think your medicine is not helping or if you have side effects. Tell him or her if you are allergic to any medicine. Keep a list of the medicines, vitamins, and herbs you  take. Include the amounts, and when and why you take them. Bring the list or the pill bottles to follow-up visits. Carry your medicine list with you in case of an emergency.    Manage contact dermatitis:   Take short baths or showers in cool water.  Use mild soap or soap-free cleansers. Add oatmeal, baking soda, or cornstarch to the bath water to help decrease skin irritation.    Avoid skin irritants , such as makeup, hair products, soaps, and cleansers. Use products that do not contain perfume or dye.    Apply a cool compress to your rash.  This will help soothe your skin.    Apply lotions or creams to the area.  These help keep your skin moist and decrease itching. Apply the lotion or cream right after a lukewarm bath or shower when your skin is still damp. Use products that do not contain a scent.    Follow up with your doctor or dermatologist in 2 to 3 days:  Write down your questions so you remember to ask them during your visits.  © Copyright [employer] 2022 Information is for End User's use only and may not be sold, redistributed or otherwise used for commercial purposes. All illustrations and images included in CareNotes® are the copyrighted property of [employer]. or [facility]  The above information is an  only. It is not intended as medical advice for individual conditions or treatments. Talk to your doctor, nurse or pharmacist before following any medical regimen to see if it is safe and effective for you.      Follow up with PCP in 3-5 days.  Proceed to  ER if symptoms worsen.    Chief Complaint     Chief Complaint   Patient presents with    Poison Ivy     x1.5 days. Poison ivy on right arm, face and bilateral legs. Works at [employer] and in contact with other patients, work wanted her to be evaluated and treated. Applied alcohol and anti itch cream and witch hazel.          History of Present Illness       Patient complains of pruritic rash of face, forearms and legs the past 2  "days, onset after gardening.          Review of Systems   Review of Systems   Constitutional:  Negative for chills and fever.   Respiratory:  Negative for shortness of breath.    Musculoskeletal:  Negative for arthralgias, joint swelling, myalgias, neck pain and neck stiffness.   Skin:  Positive for color change and rash. Negative for wound.   Neurological:  Negative for dizziness and headaches.         Current Medications     Current Medications[1]    Current Allergies     Allergies as of 06/25/2025    (No Known Allergies)            The following portions of the patient's history were reviewed and updated as appropriate: allergies, current medications, past family history, past medical history, past social history, past surgical history and problem list.     Past Medical History[2]    Past Surgical History[3]    Family History[4]      Medications have been verified.        Objective   /72 (Patient Position: Sitting)   Pulse 93   Temp 98.7 °F (37.1 °C)   Resp 16   Ht 5' 5\" (1.651 m)   Wt 75.3 kg (166 lb)   LMP 08/06/2024 (Approximate)   SpO2 98%   BMI 27.62 kg/m²        Physical Exam     Physical Exam  Vitals and nursing note reviewed.   Constitutional:       General: She is not in acute distress.     Appearance: Normal appearance. She is well-developed. She is not ill-appearing.   HENT:      Head: Normocephalic and atraumatic.      Nose: Mucosal edema present.      Mouth/Throat:      Tonsils: No tonsillar abscesses.     Musculoskeletal:      Cervical back: Neck supple.     Skin:     General: Skin is warm and dry.      Findings: Erythema and rash present.      Comments: Erythematous papulovesicular rash of face, forearms and thighs with linear distributions.     Neurological:      Mental Status: She is alert and oriented to person, place, and time.     Psychiatric:         Mood and Affect: Mood normal.         Behavior: Behavior normal.         Thought Content: Thought content normal.         " Judgment: Judgment normal.                        [1]   Current Outpatient Medications:     albuterol (PROVENTIL HFA,VENTOLIN HFA) 90 mcg/act inhaler, , Disp: , Rfl:     ALPRAZolam (XANAX) 0.5 mg tablet, Take 0.5 mg by mouth, Disp: , Rfl:     predniSONE 10 mg tablet, Take once daily all day's pills on this schedule  6- 5- 4- 3- 2- 1, Disp: 21 tablet, Rfl: 0    cyclobenzaprine (FLEXERIL) 10 mg tablet, Take 1 tablet (10 mg total) by mouth 3 (three) times a day as needed for muscle spasms (Patient not taking: Reported on 6/25/2025), Disp: 20 tablet, Rfl: 0    montelukast (SINGULAIR) 5 mg chewable tablet, Chew (Patient not taking: Reported on 6/25/2025), Disp: , Rfl:     omeprazole (PriLOSEC) 20 mg delayed release capsule, Take 1 capsule (20 mg total) by mouth daily for 14 days, Disp: 14 capsule, Rfl: 0  [2] No past medical history on file.  [3]   Past Surgical History:  Procedure Laterality Date    SKIN CANCER EXCISION      TONSILLECTOMY     [4]   Family History  Problem Relation Name Age of Onset    Thyroid disease Mother      Hypertension Father